# Patient Record
Sex: FEMALE | Race: BLACK OR AFRICAN AMERICAN | NOT HISPANIC OR LATINO | Employment: OTHER | URBAN - METROPOLITAN AREA
[De-identification: names, ages, dates, MRNs, and addresses within clinical notes are randomized per-mention and may not be internally consistent; named-entity substitution may affect disease eponyms.]

---

## 2017-10-25 LAB — CRC RECOMMENDATION EXT: NORMAL

## 2019-11-06 PROBLEM — E11.9 DIABETES MELLITUS WITHOUT COMPLICATION: Status: ACTIVE | Noted: 2019-11-06

## 2019-11-06 PROBLEM — E87.6 HYPOKALEMIA: Status: ACTIVE | Noted: 2017-10-30

## 2019-11-06 PROBLEM — H26.9 EARLY CATARACTS, BILATERAL: Status: ACTIVE | Noted: 2019-11-06

## 2020-11-10 PROBLEM — H26.9 EARLY CATARACTS, BILATERAL: Status: RESOLVED | Noted: 2019-11-06 | Resolved: 2020-11-10

## 2020-11-10 PROBLEM — E11.9 DIABETES MELLITUS WITHOUT COMPLICATION: Status: RESOLVED | Noted: 2019-11-06 | Resolved: 2020-11-10

## 2021-04-16 PROBLEM — Z00.00 PREVENTATIVE HEALTH CARE: Status: ACTIVE | Noted: 2021-04-16

## 2021-07-19 PROBLEM — Z00.00 PREVENTATIVE HEALTH CARE: Status: RESOLVED | Noted: 2021-04-16 | Resolved: 2021-07-19

## 2022-06-28 ENCOUNTER — PATIENT OUTREACH (OUTPATIENT)
Dept: ADMINISTRATIVE | Facility: HOSPITAL | Age: 72
End: 2022-06-28
Payer: MEDICARE

## 2022-06-28 NOTE — PROGRESS NOTES
Health Maintenance Due   Topic Date Due    COVID-19 Vaccine (1) Never done    Shingles Vaccine (1 of 2) Never done    Colorectal Cancer Screening  10/25/2020    Pneumococcal Vaccines (Age 65+) (3 - PPSV23 or PCV20) 11/07/2020   attempted to contact regarding BP and HM topics but got no answer nor option to LVM. Pt doesn't have portal msg.BP closed

## 2023-05-08 ENCOUNTER — PATIENT OUTREACH (OUTPATIENT)
Dept: ADMINISTRATIVE | Facility: HOSPITAL | Age: 73
End: 2023-05-08
Payer: MEDICARE

## 2023-05-08 DIAGNOSIS — E11.9 DIABETES MELLITUS WITHOUT COMPLICATION: ICD-10-CM

## 2023-05-08 DIAGNOSIS — Z12.31 BREAST CANCER SCREENING BY MAMMOGRAM: Primary | ICD-10-CM

## 2023-07-14 ENCOUNTER — PES CALL (OUTPATIENT)
Dept: ADMINISTRATIVE | Facility: CLINIC | Age: 73
End: 2023-07-14
Payer: MEDICARE

## 2024-01-18 PROBLEM — E11.69 TYPE 2 DIABETES MELLITUS WITH OTHER SPECIFIED COMPLICATION, WITHOUT LONG-TERM CURRENT USE OF INSULIN: Status: ACTIVE | Noted: 2019-11-06

## 2024-09-23 DIAGNOSIS — E11.9 TYPE 2 DIABETES MELLITUS WITHOUT COMPLICATION: ICD-10-CM
